# Patient Record
Sex: FEMALE | Race: BLACK OR AFRICAN AMERICAN | Employment: PART TIME | URBAN - METROPOLITAN AREA
[De-identification: names, ages, dates, MRNs, and addresses within clinical notes are randomized per-mention and may not be internally consistent; named-entity substitution may affect disease eponyms.]

---

## 2017-12-22 ENCOUNTER — HOSPITAL ENCOUNTER (EMERGENCY)
Age: 27
Discharge: HOME | End: 2017-12-22
Attending: EMERGENCY MEDICINE | Admitting: EMERGENCY MEDICINE
Payer: COMMERCIAL

## 2017-12-22 VITALS
TEMPERATURE: 98.2 F | SYSTOLIC BLOOD PRESSURE: 128 MMHG | HEIGHT: 64 IN | RESPIRATION RATE: 20 BRPM | DIASTOLIC BLOOD PRESSURE: 78 MMHG | OXYGEN SATURATION: 97 % | HEART RATE: 82 BPM | WEIGHT: 239.42 LBS | BODY MASS INDEX: 40.87 KG/M2

## 2017-12-22 DIAGNOSIS — J06.9 ACUTE UPPER RESPIRATORY INFECTION: Primary | ICD-10-CM

## 2017-12-22 LAB
FLUAV AG NPH QL IA: NEGATIVE
FLUBV AG NOSE QL IA: NEGATIVE

## 2017-12-22 PROCEDURE — 94640 AIRWAY INHALATION TREATMENT: CPT

## 2017-12-22 PROCEDURE — 99283 EMERGENCY DEPT VISIT LOW MDM: CPT

## 2017-12-22 PROCEDURE — 74011000250 HC RX REV CODE- 250: Performed by: EMERGENCY MEDICINE

## 2017-12-22 PROCEDURE — 87804 INFLUENZA ASSAY W/OPTIC: CPT | Performed by: EMERGENCY MEDICINE

## 2017-12-22 PROCEDURE — 77030029684 HC NEB SM VOL KT MONA -A

## 2017-12-22 RX ORDER — IPRATROPIUM BROMIDE AND ALBUTEROL SULFATE 2.5; .5 MG/3ML; MG/3ML
3 SOLUTION RESPIRATORY (INHALATION) ONCE
Status: COMPLETED | OUTPATIENT
Start: 2017-12-22 | End: 2017-12-22

## 2017-12-22 RX ORDER — ALBUTEROL SULFATE 90 UG/1
2 AEROSOL, METERED RESPIRATORY (INHALATION)
Qty: 1 INHALER | Refills: 0 | Status: SHIPPED | OUTPATIENT
Start: 2017-12-22

## 2017-12-22 RX ORDER — IPRATROPIUM BROMIDE AND ALBUTEROL SULFATE 2.5; .5 MG/3ML; MG/3ML
SOLUTION RESPIRATORY (INHALATION)
Status: DISCONTINUED
Start: 2017-12-22 | End: 2017-12-22 | Stop reason: HOSPADM

## 2017-12-22 RX ORDER — BENZONATATE 100 MG/1
100 CAPSULE ORAL
Qty: 30 CAP | Refills: 0 | Status: SHIPPED | OUTPATIENT
Start: 2017-12-22 | End: 2017-12-29

## 2017-12-22 RX ADMIN — IPRATROPIUM BROMIDE AND ALBUTEROL SULFATE 3 ML: .5; 3 SOLUTION RESPIRATORY (INHALATION) at 01:24

## 2017-12-22 NOTE — ED PROVIDER NOTES
EMERGENCY DEPARTMENT HISTORY AND PHYSICAL EXAM      Date: 12/22/2017  Patient Name: Linden More    History of Presenting Illness     Chief Complaint   Patient presents with    Cold Symptoms     cough, nasal congestion, sore throat and sinus pressure onset monday, denies fever       History Provided By: Patient    HPI: Linden More, 28 y.o. female presents ambulatory to the ED with cc of cough with associated post-tussive emesis, nasal congestion, sore throat and generalized body aches x 4 days. Pt reports taking Nyquil, Emergen-C, OTC nasal spray, and cough drops with temporary relief. She denies any known sick contact. Pt specifically denies any fever, chills, diarrhea, HA or ear pain. She is eating and drinking normally. PCP: None    There are no other complaints, changes, or physical findings at this time. Current Facility-Administered Medications   Medication Dose Route Frequency Provider Last Rate Last Dose    albuterol-ipratropium (DUO-NEB) 2.5 mg-0.5 mg/3 ml nebulizer solution              Current Outpatient Prescriptions   Medication Sig Dispense Refill    albuterol (PROVENTIL HFA, VENTOLIN HFA, PROAIR HFA) 90 mcg/actuation inhaler Take 2 Puffs by inhalation every four (4) hours as needed for Wheezing. 1 Inhaler 0    sod bicarb-sod chlor-neti pot pkdv 1 Package by Nasal route two (2) times a day for 7 days. 14 Package 0    benzonatate (TESSALON PERLES) 100 mg capsule Take 1 Cap by mouth three (3) times daily as needed for Cough for up to 7 days. 30 Cap 0       Past History     Past Medical History:  History reviewed. No pertinent past medical history. Past Surgical History:  History reviewed. No pertinent surgical history. Family History:  History reviewed. No pertinent family history.     Social History:  Social History   Substance Use Topics    Smoking status: Never Smoker    Smokeless tobacco: None    Alcohol use No       Allergies:  No Known Allergies      Review of Systems Review of Systems   Constitutional: Negative for activity change, appetite change, chills, fever and unexpected weight change. HENT: Positive for congestion. Negative for ear pain. Eyes: Negative for pain and visual disturbance. Respiratory: Positive for cough (with post-tussive emsis) and shortness of breath. Cardiovascular: Negative for chest pain. Gastrointestinal: Negative for abdominal pain, diarrhea, nausea and vomiting. Genitourinary: Negative for dysuria. Musculoskeletal: Negative for back pain. (+) generalized body aches   Skin: Negative for rash. Neurological: Negative for headaches. Physical Exam   Physical Exam   Constitutional: She is oriented to person, place, and time. Overweight female, minimal acute distress   HENT:   Head: Normocephalic and atraumatic. Mouth/Throat: Posterior oropharyngeal erythema present. No oropharyngeal exudate. No sinus percussion tenderness   Eyes: Conjunctivae and EOM are normal. Pupils are equal, round, and reactive to light. Right eye exhibits no discharge. Left eye exhibits no discharge. Neck: Normal range of motion. Neck supple. Cardiovascular: Normal rate, regular rhythm and normal heart sounds. No murmur heard. Pulmonary/Chest: Effort normal. No respiratory distress. She has no wheezes. She has no rales. Mild bronchospasm   Abdominal: Soft. Bowel sounds are normal. She exhibits no distension. There is no tenderness. Musculoskeletal: Normal range of motion. She exhibits no edema. Lymphadenopathy:     She has no cervical adenopathy. Neurological: She is alert and oriented to person, place, and time. No cranial nerve deficit. She exhibits normal muscle tone. Skin: Skin is warm and dry. No rash noted. She is not diaphoretic. Nursing note and vitals reviewed.         Diagnostic Study Results     Labs -     Recent Results (from the past 12 hour(s))   INFLUENZA A & B AG (RAPID TEST)    Collection Time: 12/22/17  1:15 AM   Result Value Ref Range    Influenza A Antigen NEGATIVE  NEG      Influenza B Antigen NEGATIVE  NEG         Medical Decision Making   I am the first provider for this patient. I reviewed the vital signs, available nursing notes, past medical history, past surgical history, family history and social history. Vital Signs-Reviewed the patient's vital signs. Patient Vitals for the past 12 hrs:   Temp Pulse Resp BP SpO2   12/22/17 0142 - - - 108/71 98 %   12/22/17 0054 98.2 °F (36.8 °C) 82 20 145/81 100 %       Records Reviewed: Nursing Notes and Old Medical Records    Provider Notes (Medical Decision Making): Well appearing female in no acute distress without evidence of PNA, sinusitis, or sepsis. ED Course:   1:08 AM  Initial assessment performed. The patients presenting problems have been discussed, and they are in agreement with the care plan formulated and outlined with them. I have encouraged them to ask questions as they arise throughout their visit. 01:45 Patient appears improved. Discussed results as well as home care. Prescriptions given. Disposition:  DISCHARGE NOTE:  1:58 AM  The patient has been re-evaluated and is ready for discharge. Reviewed available results with patient. Counseled patient on diagnosis and care plan. Patient has expressed understanding, and all questions have been answered. Patient agrees with plan and agrees to follow up as recommended, or return to the ED if their symptoms worsen. Discharge instructions have been provided and explained to the patient, along with reasons to return to the ED. PLAN:  1. Current Discharge Medication List      START taking these medications    Details   albuterol (PROVENTIL HFA, VENTOLIN HFA, PROAIR HFA) 90 mcg/actuation inhaler Take 2 Puffs by inhalation every four (4) hours as needed for Wheezing. Qty: 1 Inhaler, Refills: 0      sod bicarb-sod chlor-neti pot pkdv 1 Package by Nasal route two (2) times a day for 7 days.   Qty: 14 Package, Refills: 0      benzonatate (TESSALON PERLES) 100 mg capsule Take 1 Cap by mouth three (3) times daily as needed for Cough for up to 7 days. Qty: 30 Cap, Refills: 0           2. Follow-up Information     Follow up With Details Comments Contact Info    [facility]  If symptoms worsen [address] [address]  [phone]        Return to ED if worse     Diagnosis     Clinical Impression:   1. Acute upper respiratory infection        Attestations:  ATTESTATION:  This note is prepared by Kwaku Wesley, acting as Scribe for Rosie Ramos MD.     Rosie Ramos MD: The scribe's documentation has been prepared under my direction and personally reviewed by me in its entirety. I confirm that the note above accurately reflects all work, treatment, procedures, and medical decision making performed by me.

## 2017-12-22 NOTE — DISCHARGE INSTRUCTIONS
Saline Nasal Washes: Care Instructions  Your Care Instructions  Saline nasal washes help keep the nasal passages open by washing out thick or dried mucus. This simple remedy can help relieve symptoms of allergies, sinusitis, and colds. It also can make the nose feel more comfortable by keeping the mucous membranes moist. You may notice a little burning sensation in your nose the first few times you use the solution, but this usually gets better in a few days. Follow-up care is a key part of your treatment and safety. Be sure to make and go to all appointments, and call your doctor if you are having problems. It's also a good idea to know your test results and keep a list of the medicines you take. How can you care for yourself at home? · You can buy premixed saline solution in a squeeze bottle or other sinus rinse products at a drugstore. Read and follow the instructions on the label. · You also can make your own saline solution by adding 1 teaspoon of salt and 1 teaspoon of baking soda to 2 cups of distilled water. · If you use a homemade solution, pour a small amount into a clean bowl. Using a rubber bulb syringe, squeeze the syringe and place the tip in the salt water. Pull a small amount of the salt water into the syringe by relaxing your hand. · Sit down with your head tilted slightly back. Do not lie down. Put the tip of the bulb syringe or the squeeze bottle a little way into one of your nostrils. Gently drip or squirt a few drops into the nostril. Repeat with the other nostril. Some sneezing and gagging are normal at first.  · Gently blow your nose. · Wipe the syringe or bottle tip clean after each use. · Repeat this 2 or 3 times a day. · Use nasal washes gently if you have nosebleeds often. When should you call for help? Watch closely for changes in your health, and be sure to contact your doctor if:  ? · You often get nosebleeds. ? · You have problems doing the nasal washes.    Where can you learn more? Go to http://jerry-stella.info/. Enter 071 [id number] [id number] in the search box to learn more about \"Saline Nasal Washes: Care Instructions. \"  Current as of: May 12, 2017  Content Version: 11.4  © 3587-6825 [employer]. Care instructions adapted under license by [employer] (which disclaims liability or warranty for this information). If you have questions about a medical condition or this instruction, always ask your healthcare professional. [address] any warranty or liability for your use of this information. Upper Respiratory Infection (Cold): Care Instructions  Your Care Instructions    An upper respiratory infection, or URI, is an infection of the nose, sinuses, or throat. URIs are spread by coughs, sneezes, and direct contact. The common cold is the most frequent kind of URI. The flu and sinus infections are other kinds of URIs. Almost all URIs are caused by viruses. Antibiotics won't cure them. But you can treat most infections with home care. This may include drinking lots of fluids and taking over-the-counter pain medicine. You will probably feel better in 4 to 10 days. The doctor has checked you carefully, but problems can develop later. If you notice any problems or new symptoms, get medical treatment right away. Follow-up care is a key part of your treatment and safety. Be sure to make and go to all appointments, and call your doctor if you are having problems. It's also a good idea to know your test results and keep a list of the medicines you take. How can you care for yourself at home? · To prevent dehydration, drink plenty of fluids, enough so that your urine is light yellow or clear like water. Choose water and other caffeine-free clear liquids until you feel better. If you have kidney, heart, or liver disease and have to limit fluids, talk with your doctor before you increase the amount of fluids you drink.   · Take an over-the-counter pain medicine, such as acetaminophen (Tylenol), ibuprofen (Advil, Motrin), or naproxen (Aleve). Read and follow all instructions on the label. · Before you use cough and cold medicines, check the label. These medicines may not be safe for young children or for people with certain health problems. · Be careful when taking over-the-counter cold or flu medicines and Tylenol at the same time. Many of these medicines have acetaminophen, which is Tylenol. Read the labels to make sure that you are not taking more than the recommended dose. Too much acetaminophen (Tylenol) can be harmful. · Get plenty of rest.  · Do not smoke or allow others to smoke around you. If you need help quitting, talk to your doctor about stop-smoking programs and medicines. These can increase your chances of quitting for good. When should you call for help? Call 911 anytime you think you may need emergency care. For example, call if:  ? · You have severe trouble breathing. ?Call your doctor now or seek immediate medical care if:  ? · You seem to be getting much sicker. ? · You have new or worse trouble breathing. ? · You have a new or higher fever. ? · You have a new rash. ? Watch closely for changes in your health, and be sure to contact your doctor if:  ? · You have a new symptom, such as a sore throat, an earache, or sinus pain. ? · You cough more deeply or more often, especially if you notice more mucus or a change in the color of your mucus. ? · You do not get better as expected. Where can you learn more? Go to http://jerry-stella.info/. Enter I851 in the search box to learn more about \"Upper Respiratory Infection (Cold): Care Instructions. \"  Current as of: May 12, 2017  Content Version: 11.4  © 6863-6460 [employer]. Care instructions adapted under license by [employer] (which disclaims liability or warranty for this information).  If you have questions about a medical condition or this instruction, always ask your healthcare professional. [place] [address] any warranty or liability for your use of this information.

## 2017-12-22 NOTE — ED NOTES
Patient discharged and given discharge instructions by Felipe Miller MD. Patient had an opportunity to ask questions. Patient verbalized understanding of discharge instructions. Patient d/c from ED ambulatory, discharge instructions and prescriptions in hand. Patient accompanied by self.

## 2017-12-22 NOTE — ED NOTES
Patient presents to ED with C/O nasal drainage, body aches, fatigue, productive cough, and stress incontinence when coughing and sneezing that started Monday. The pt stated the symptoms started on Monday with a sore throat. The pt denies fever and chills. Patient is A&Ox3, call bell w/in reach, and aware of plan of care. The patient is in NAD.

## 2020-11-14 ENCOUNTER — HOSPITAL ENCOUNTER (EMERGENCY)
Age: 30
Discharge: HOME | End: 2020-11-14
Attending: EMERGENCY MEDICINE

## 2020-11-14 ENCOUNTER — APPOINTMENT (OUTPATIENT)
Dept: GENERAL RADIOLOGY | Age: 30
End: 2020-11-14
Attending: PHYSICIAN ASSISTANT

## 2020-11-14 VITALS
BODY MASS INDEX: 38.71 KG/M2 | DIASTOLIC BLOOD PRESSURE: 61 MMHG | WEIGHT: 232.37 LBS | SYSTOLIC BLOOD PRESSURE: 121 MMHG | HEART RATE: 72 BPM | OXYGEN SATURATION: 100 % | HEIGHT: 65 IN | RESPIRATION RATE: 14 BRPM | TEMPERATURE: 98.9 F

## 2020-11-14 DIAGNOSIS — S91.209A AVULSION OF TOENAIL, INITIAL ENCOUNTER: Primary | ICD-10-CM

## 2020-11-14 PROCEDURE — 74011000250 HC RX REV CODE- 250: Performed by: PHYSICIAN ASSISTANT

## 2020-11-14 PROCEDURE — 75810000283 HC INJECTION NERVE BLOCK

## 2020-11-14 PROCEDURE — 73660 X-RAY EXAM OF TOE(S): CPT

## 2020-11-14 PROCEDURE — 99282 EMERGENCY DEPT VISIT SF MDM: CPT

## 2020-11-14 RX ORDER — LIDOCAINE HYDROCHLORIDE 10 MG/ML
5 INJECTION, SOLUTION EPIDURAL; INFILTRATION; INTRACAUDAL; PERINEURAL ONCE
Status: COMPLETED | OUTPATIENT
Start: 2020-11-14 | End: 2020-11-14

## 2020-11-14 RX ORDER — CEPHALEXIN 500 MG/1
500 CAPSULE ORAL 4 TIMES DAILY
Qty: 28 CAP | Refills: 0 | Status: SHIPPED | OUTPATIENT
Start: 2020-11-14 | End: 2020-11-21

## 2020-11-14 RX ADMIN — LIDOCAINE HYDROCHLORIDE 5 ML: 10 INJECTION, SOLUTION EPIDURAL; INFILTRATION; INTRACAUDAL; PERINEURAL at 19:01

## 2020-11-14 NOTE — ED NOTES
Patient ambulatory to room w c/o striking her toe on her son's toy around 1400. Patient c/o R pinky toe pain. 1836- Patient also reports her coworker tested positive so she wants to make sure she is safe. 65- Discharge instructions given to patient by [facility].  Verbalized understanding of instructions. Patient discharged without difficulty. Patient discharged in stable condition ambulatory.

## 2020-11-14 NOTE — ED PROVIDER NOTES
EMERGENCY DEPARTMENT HISTORY AND PHYSICAL EXAM      Date: 11/14/2020  Patient Name: Alfred Jones    History of Presenting Illness     Chief Complaint   Patient presents with    Toe Pain     stubbed her fifth toe right foot on her son's toy; the toenail is pulled up it is almost off.  Concern For COVID-19 (Coronavirus)     aysmptomatic but would like the test; she had a coworker test positive. History Provided By: Patient    HPI: Alfred Jones, 31 y.o. female without significant PMHx, presents by POV to the ED with cc of right 5th toe pain. The patient notes that this afternoon she accidentally stubbed her toe on her son's toy. She had immediate onset of pain and bleeding. She applied a Band-Aid and then several hours later took the [address] off to reapply a new one and noticed that the toenail had almost completely come off. The pain is localized and nonradiating. The patient reports that she was exposed very briefly to a coworker who tested positive for Covid. The patient stated exposure was Wednesday, November 11. The patient is asymptomatic. She has no URI complaints. There is been no fever, chills, cough, rhinorrhea, congestion, loss of sensation of smell or taste. She is already scheduled herself to a rapid test this coming Tuesday, November 17 at [place]. There are no other complaints, changes, or physical findings at this time. Social Hx: Tobacco (denies), EtOH (social; rare), Illicit drug use (denies)     PCP: [facility], Pcp    No current facility-administered medications on file prior to encounter. Current Outpatient Medications on File Prior to Encounter   Medication Sig Dispense Refill    albuterol (PROVENTIL HFA, VENTOLIN HFA, PROAIR HFA) 90 mcg/actuation inhaler Take 2 Puffs by inhalation every four (4) hours as needed for Wheezing. 1 Inhaler 0       Past History     Past Medical History:  History reviewed. No pertinent past medical history.     Past Surgical History:  History reviewed. No pertinent surgical history. Family History:  History reviewed. No pertinent family history. Social History:  Social History     Tobacco Use    Smoking status: Never Smoker    Smokeless tobacco: Never Used   Substance Use Topics    Alcohol use: No    Drug use: No       Allergies:  No Known Allergies      Review of Systems   Review of Systems   Constitutional: Negative for chills, diaphoresis and fever. HENT: Negative for congestion, ear pain, rhinorrhea and sore throat. Respiratory: Negative for cough and shortness of breath. Cardiovascular: Negative for chest pain. Gastrointestinal: Negative for abdominal pain, constipation, diarrhea, nausea and vomiting. Genitourinary: Negative for difficulty urinating, dysuria, frequency and hematuria. Musculoskeletal: Positive for arthralgias. Negative for myalgias. Skin: Positive for wound. Neurological: Negative for headaches. All other systems reviewed and are negative. Physical Exam   Physical Exam  Vitals signs and nursing note reviewed. Constitutional:       General: She is not in acute distress. Appearance: She is well-developed. She is not diaphoretic. Comments: 31 y.o. -American female    HENT:      Head: Normocephalic and atraumatic. Eyes:      General:         Right eye: No discharge. Left eye: No discharge. Conjunctiva/sclera: Conjunctivae normal.   Neck:      Musculoskeletal: Normal range of motion and neck supple. Cardiovascular:      Rate and Rhythm: Normal rate and regular rhythm. Heart sounds: Normal heart sounds. No murmur. Pulmonary:      Effort: Pulmonary effort is normal. No respiratory distress. Breath sounds: Normal breath sounds. Musculoskeletal:      Comments: Right fifth toe with the nail nearly completely avulsed. There is slight swelling. There is no ecchymosis or deformity.   There is minimal tenderness palpation to the distal aspect of the toe. Skin:     General: Skin is warm and dry. Neurological:      Mental Status: She is alert and oriented to person, place, and time. Psychiatric:         Behavior: Behavior normal.         Diagnostic Study Results     Labs - none    Radiologic Studies -   XR 5TH TOE RT MIN 2 V   Final Result   IMPRESSION:       Fifth toe soft tissue laceration. No radiodense foreign body or fracture. Medical Decision Making   I am the first provider for this patient. I reviewed the vital signs, available nursing notes, past medical history, past surgical history, family history and social history. Vital Signs-Reviewed the patient's vital signs. Patient Vitals for the past 12 hrs:   Temp Pulse Resp BP SpO2   11/14/20 1817 98.9 °F (37.2 °C) 72 14 121/61 100 %       Records Reviewed: Nursing Notes    Provider Notes (Medical Decision Making):   Patient presents the ED with a toe injury. Differential diagnosis include fracture, sprain, strain, contusion, nail avulsion. X-rays are negative for fracture. The nail is nearly completely avulsed and removed as procedure note below. ED Course:   Initial assessment performed. The patients presenting problems have been discussed, and they are in agreement with the care plan formulated and outlined with them. I have encouraged them to ask questions as they arise throughout their visit. Patient had discussed Covid testing in the ED. At this time is only been 3 days since her exposure, which was less than 15 minutes and both parties were masked. The current guidelines note that positive exposure is being within 6 feet of somebody for 15 minutes or more irregardless with a mask is worn or not. Additionally, it is recommended that asymptomatic patients wait 5 to 7 days from exposure prior to testing.   Since the patient's exposure was very limited and it is not been the recommended length of time for testing in asymptomatic patient she is decided to wait until her scheduled appointment at [facility] this coming Tuesday for testing. Procedure Note - Nail Avulsion:   7:39 PM  Performed by: AMAURI Lucas   Pt's  right 5th toe was anesthetized locally with 2 mL of Lidocaine 1% without epi in a digital block. The nail was removed using scissors. No discharge expressed. Estimated blood loss: None  The procedure took 1-15 minutes, and pt tolerated well. Critical Care Time: None    Disposition:  Presents the ED for toe injury. Differential diagnosis include fracture, sprain, strain, avulsion, nail injury. X-rays are negative for acute injury. The nail has been removed as procedure note below. PLAN:  1. Current Discharge Medication List      START taking these medications    Details   cephALEXin (Keflex) 500 mg capsule Take 1 Cap by mouth four (4) times daily for 7 days. Qty: 28 Cap, Refills: 0           2. Follow-up Information     Follow up With Specialties Details Why Contact Info    Amy Felipe MD Orthopedic Surgery In 1 week As needed [address] [place] [address]  [address]  [address] [facility] [place]  [phone] as scheduled on Tuesday for [address]          3. Wound care as discussed. Return to ED if worse     Diagnosis     Clinical Impression:   1. Avulsion of toenail, initial encounter          Please note that this dictation was completed with Kaai, the computer voice recognition software. Quite often unanticipated grammatical, syntax, homophones, and other interpretive errors are inadvertently transcribed by the computer software. Please disregards these errors. Please excuse any errors that have escaped final proofreading.

## 2021-08-07 ENCOUNTER — HOSPITAL ENCOUNTER (EMERGENCY)
Age: 31
Discharge: HOME | End: 2021-08-07
Attending: EMERGENCY MEDICINE

## 2021-08-07 VITALS
RESPIRATION RATE: 16 BRPM | WEIGHT: 244.49 LBS | TEMPERATURE: 98.4 F | OXYGEN SATURATION: 100 % | HEART RATE: 62 BPM | BODY MASS INDEX: 41.74 KG/M2 | SYSTOLIC BLOOD PRESSURE: 115 MMHG | HEIGHT: 64 IN | DIASTOLIC BLOOD PRESSURE: 62 MMHG
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
## 2021-08-07 DIAGNOSIS — M75.41 IMPINGEMENT SYNDROME OF RIGHT SHOULDER: Primary | ICD-10-CM

## 2021-08-07 LAB — HCG UR QL: NEGATIVE

## 2021-08-07 PROCEDURE — 74011250637 HC RX REV CODE- 250/637: Performed by: EMERGENCY MEDICINE

## 2021-08-07 PROCEDURE — 99283 EMERGENCY DEPT VISIT LOW MDM: CPT

## 2021-08-07 PROCEDURE — 81025 URINE PREGNANCY TEST: CPT

## 2021-08-07 RX ORDER — IBUPROFEN 400 MG/1
800 TABLET ORAL ONCE
Status: COMPLETED | OUTPATIENT
Start: 2021-08-07 | End: 2021-08-07

## 2021-08-07 RX ORDER — ACETAMINOPHEN 325 MG/1
650 TABLET ORAL ONCE
Status: COMPLETED | OUTPATIENT
Start: 2021-08-07 | End: 2021-08-07

## 2021-08-07 RX ADMIN — IBUPROFEN 800 MG: 400 TABLET, FILM COATED ORAL at 13:34

## 2021-08-07 RX ADMIN — ACETAMINOPHEN 650 MG: 325 TABLET ORAL at 13:34
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
## 2021-08-07 NOTE — LETTER
[address]  [facility] EMERGENCY DEPT  [address] [facility]  [place] [id number] [phone]    Work/School Note    Date: 8/7/2021    To Whom It May concern:    Fannie Waller was seen and treated today in the emergency room by the following Scooter Matos MD.      Fannie Waller may return to work on August 9, 2021.     Sincerely,          Jeremy Baum

## 2021-08-07 NOTE — ED NOTES
Adryan Mason reviewed discharge instructions with the patient. The patient verbalized understanding. All questions and concerns were addressed. The patient declined a wheelchair and is discharged ambulatory in the care of family members with instructions and prescriptions in hand. Pt is alert and oriented x 4. Respirations are clear and unlabored.
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
## 2021-08-07 NOTE — Clinical Note
[address]  [facility] EMERGENCY DEPT  [address] [place] [address]  Anaid Velasquez [id number] [phone]    Work/School Note    Date: 8/7/2021    To Whom It May concern:    Deneen Chavarria was seen and treated today in the emergency room by the following provider(s):  Attending Provider: Sabrina Wright MD.      Deneen Chavarria is excused from work/school on 08/07/21 and 08/08/21. She is medically clear to return to work/school on 8/9/2021.        Sincerely,          Adela Mendoza MD

## 2021-12-31 ENCOUNTER — APPOINTMENT (OUTPATIENT)
Dept: GENERAL RADIOLOGY | Age: 31
End: 2021-12-31
Attending: EMERGENCY MEDICINE

## 2021-12-31 ENCOUNTER — HOSPITAL ENCOUNTER (EMERGENCY)
Age: 31
Discharge: HOME | End: 2021-12-31
Attending: EMERGENCY MEDICINE

## 2021-12-31 VITALS
HEART RATE: 68 BPM | RESPIRATION RATE: 18 BRPM | BODY MASS INDEX: 42.15 KG/M2 | HEIGHT: 64 IN | OXYGEN SATURATION: 100 % | WEIGHT: 246.91 LBS | SYSTOLIC BLOOD PRESSURE: 127 MMHG | TEMPERATURE: 98.2 F | DIASTOLIC BLOOD PRESSURE: 67 MMHG

## 2021-12-31 DIAGNOSIS — J02.9 ACUTE PHARYNGITIS, UNSPECIFIED ETIOLOGY: ICD-10-CM

## 2021-12-31 DIAGNOSIS — R09.81 NASAL CONGESTION: ICD-10-CM

## 2021-12-31 DIAGNOSIS — J06.9 ACUTE UPPER RESPIRATORY INFECTION: ICD-10-CM

## 2021-12-31 DIAGNOSIS — U07.1 COVID-19 VIRUS INFECTION: Primary | ICD-10-CM

## 2021-12-31 LAB — DEPRECATED S PYO AG THROAT QL EIA: NEGATIVE

## 2021-12-31 PROCEDURE — 71045 X-RAY EXAM CHEST 1 VIEW: CPT

## 2021-12-31 PROCEDURE — 87070 CULTURE OTHR SPECIMN AEROBIC: CPT

## 2021-12-31 PROCEDURE — 87880 STREP A ASSAY W/OPTIC: CPT

## 2021-12-31 PROCEDURE — 74011250637 HC RX REV CODE- 250/637: Performed by: EMERGENCY MEDICINE

## 2021-12-31 PROCEDURE — 99282 EMERGENCY DEPT VISIT SF MDM: CPT

## 2021-12-31 RX ORDER — ASCORBIC ACID 500 MG
500 TABLET ORAL 2 TIMES DAILY
Qty: 10 TABLET | Refills: 0 | Status: SHIPPED | OUTPATIENT
Start: 2021-12-31 | End: 2022-01-05

## 2021-12-31 RX ORDER — MONTELUKAST SODIUM 10 MG/1
10 TABLET ORAL DAILY
Qty: 20 TABLET | Refills: 0 | Status: SHIPPED | OUTPATIENT
Start: 2021-12-31

## 2021-12-31 RX ORDER — OXYMETAZOLINE HCL 0.05 %
2 SPRAY, NON-AEROSOL (ML) NASAL ONCE
Status: COMPLETED | OUTPATIENT
Start: 2021-12-31 | End: 2021-12-31

## 2021-12-31 RX ORDER — DEXAMETHASONE SODIUM PHOSPHATE 4 MG/ML
10 INJECTION, SOLUTION INTRA-ARTICULAR; INTRALESIONAL; INTRAMUSCULAR; INTRAVENOUS; SOFT TISSUE ONCE
Status: COMPLETED | OUTPATIENT
Start: 2021-12-31 | End: 2021-12-31

## 2021-12-31 RX ORDER — UREA 10 %
220 LOTION (ML) TOPICAL 2 TIMES DAILY
Qty: 45 TABLET | Refills: 0 | Status: SHIPPED | OUTPATIENT
Start: 2021-12-31 | End: 2022-01-05

## 2021-12-31 RX ORDER — PREDNISONE 10 MG/1
TABLET ORAL
Qty: 21 TABLET | Refills: 0 | Status: SHIPPED | OUTPATIENT
Start: 2021-12-31 | End: 2022-10-31 | Stop reason: ALTCHOICE

## 2021-12-31 RX ORDER — GUAIFENESIN 600 MG/1
600 TABLET, EXTENDED RELEASE ORAL 2 TIMES DAILY
Qty: 20 TABLET | Refills: 0 | Status: SHIPPED | OUTPATIENT
Start: 2021-12-31

## 2021-12-31 RX ADMIN — DEXAMETHASONE SODIUM PHOSPHATE 10 MG: 4 INJECTION INTRA-ARTICULAR; INTRALESIONAL; INTRAMUSCULAR; INTRAVENOUS; SOFT TISSUE at 04:42

## 2021-12-31 RX ADMIN — OXYMETAZOLINE HCL 2 SPRAY: 0.05 SPRAY NASAL at 04:42

## 2021-12-31 NOTE — DISCHARGE INSTRUCTIONS
Thank You! It was a pleasure taking care of you in our Emergency Department today. We know that when you come to [facility], you are entrusting us with your health, comfort, and safety. Our physicians and nurses honor that trust, and truly appreciate the opportunity to care for you and your loved ones. We also value your feedback. If you receive a survey about your Emergency Department experience today, please fill it out. We care about our patients' feedback, and we listen to what you have to say. Thank you. Dr. Derek Lomeli M.D.      ________________________________________________________________________  I have included a copy of your lab results and/or radiologic studies from today's visit so you can have them easily available at your follow-up visit. We hope you feel better and please do not hesitate to contact the ED if you have any questions at all! No results found for this or any previous visit (from the past 12 hour(s)). XR CHEST PORT   Final Result   No acute process. CT Results  (Last 48 hours)      None          The exam and treatment you received in the Emergency Department were for an urgent problem and are not intended as complete care. It is important that you follow up with a doctor, nurse practitioner, or physician assistant for ongoing care. If your symptoms become worse or you do not improve as expected and you are unable to reach your usual health care provider, you should return to the Emergency Department. We are available 24 hours a day. Please take your discharge instructions with you when you go to your follow-up appointment. If a prescription has been provided, please have it filled as soon as possible to prevent a delay in treatment. Read the entire medication instruction sheet provided to you by the pharmacy.  If you have any questions or reservations about taking the medication due to side effects or interactions with other medications, please call your primary care physician or contact the ER to speak with the charge nurse. Please make an appointment with your family doctor or the physician you were referred to for follow-up of this visit as instructed on your discharge paperwork. Return to the ER if you are unable to be seen or if you are unable to be seen in a timely manner. If you have any problem arranging the follow-up visit, contact the Emergency Department immediately.

## 2021-12-31 NOTE — ED PROVIDER NOTES
EMERGENCY DEPARTMENT HISTORY AND PHYSICAL EXAM      Please note that this dictation was completed with the assistance of \"Dragon\", the computer voice recognition software. Quite often unanticipated grammatical, syntax, homophones, and other interpretive errors are inadvertently transcribed by the computer software. Please disregard these errors and any errors that have escaped final proofreading. Thank you. Patient: Susan Costa  DOS: 21  : 1990  MRN: [id number]  History of Presenting Illness     Chief Complaint   Patient presents with    Positive For Covid-19     Pt ambulatroy to triage, tested positive ; having dry cough, sore throat; is vaccinated x 2      History Provided By: Patient/family/EMS (if applicable)    HPI: Susan Costa, 27 y.o. female with past medical history as documented below presents to the ED with c/o of 5 days of dry cough, sore throat, nasal congestion. Patient states that she was tested positive for COVID-19 on [place]. She is vaccinated x2. Denies any chest pain or shortness of breath. Pt denies any other exacerbating or ameliorating factors. Additionally, pt specifically denies any recent fever, chills, headache, nausea, vomiting, abdominal pain, CP, SOB, lightheadedness, dizziness, numbness, weakness, lower extremity swelling, heart palpitations, urinary sxs, diarrhea, constipation, melena, hematochezia. There are no other complaints, changes or physical findings pertinent to the HPI at this time.     PCP: [facility], Pcp  Past History   Past Medical History:  Denies    Past Surgical History:  Denies    Family History:   Family history reviewed and was non-contributory, unless specified below:    Social History:  Social History     Tobacco Use    Smoking status: Never Smoker    Smokeless tobacco: Never Used   Substance Use Topics    Alcohol use: No    Drug use: No       Allergies:  No Known Allergies    Current Medications:  No current facility-administered medications on file prior to encounter. Current Outpatient Medications on File Prior to Encounter   Medication Sig Dispense Refill    albuterol (PROVENTIL HFA, VENTOLIN HFA, PROAIR HFA) 90 mcg/actuation inhaler Take 2 Puffs by inhalation every four (4) hours as needed for Wheezing. 1 Inhaler 0     Review of Systems   A complete ROS was reviewed by me today and all other systems negative, unless otherwise specified below:  Review of Systems   Constitutional: Negative. Negative for chills and fever. HENT: Positive for congestion and sore throat. Eyes: Negative. Respiratory: Positive for cough. Negative for chest tightness, shortness of breath and wheezing. Cardiovascular: Negative. Negative for chest pain, palpitations and leg swelling. Gastrointestinal: Negative. Negative for abdominal distention, abdominal pain, blood in stool, constipation, diarrhea, nausea and vomiting. Endocrine: Negative. Genitourinary: Negative. Negative for dysuria, flank pain, frequency, hematuria and urgency. Musculoskeletal: Negative. Negative for arthralgias, back pain and myalgias. Skin: Negative. Negative for color change and rash. Neurological: Negative. Negative for dizziness, syncope, speech difficulty, weakness, light-headedness, numbness and headaches. Hematological: Negative. Psychiatric/Behavioral: Negative. Negative for confusion and self-injury. The patient is not nervous/anxious. All other systems reviewed and are negative. Physical Exam   Physical Exam  Vitals and nursing note reviewed. Constitutional:       General: She is not in acute distress. Appearance: She is well-developed. She is not diaphoretic. HENT:      Head: Normocephalic and atraumatic. Nose: Congestion present. Mouth/Throat:      Pharynx: No oropharyngeal exudate.    Eyes:      Conjunctiva/sclera: Conjunctivae normal.   Cardiovascular:      Rate and Rhythm: Normal rate and regular rhythm. Heart sounds: Normal heart sounds. Pulmonary:      Effort: Pulmonary effort is normal. No respiratory distress. Breath sounds: Normal breath sounds. No wheezing or rales. Chest:      Chest wall: No tenderness. Abdominal:      General: Bowel sounds are normal. There is no distension. Palpations: Abdomen is soft. There is no mass. Tenderness: There is no abdominal tenderness. There is no guarding or rebound. Musculoskeletal:         General: Normal range of motion. Cervical back: Normal range of motion. Skin:     General: Skin is warm. Neurological:      Mental Status: She is alert and oriented to person, place, and time. Cranial Nerves: No cranial nerve deficit. Motor: No abnormal muscle tone. Diagnostic Study Results     Laboratory Data  I have personally reviewed and interpreted all available laboratory results. Recent Results (from the past 24 hour(s))   STREP AG SCREEN, GROUP A    Collection Time: 12/31/21  4:44 AM    Specimen: Swab; Throat   Result Value Ref Range    Group A Strep Ag ID Negative NEG         Radiologic Studies   I have personally reviewed and interpreted all available imaging studies and agree with radiology interpretation. XR CHEST PORT   Final Result   No acute process. CT Results  (Last 48 hours)    None        CXR Results  (Last 48 hours)               12/31/21 0308  XR CHEST PORT Final result    Impression:  No acute process. Narrative:  INDICATION: cough, covid+       EXAM:  AP CHEST RADIOGRAPH       COMPARISON: June 9, 2016       FINDINGS:       AP portable view of the chest demonstrates a normal cardiomediastinal   silhouette. There is no edema, effusion, consolidation, or pneumothorax. The   osseous structures are unremarkable. Medical Decision Making   I am the first and primary ED physician for this patient's ED visit today.     I reviewed our electronic medical record system for any past medical records that may contribute to the patient's current condition, including their past medical history, surgical history, social and family history. This also includes their most recent ED visits, previous hospitalizations and prior diagnostic data. I have reviewed and summarized the most pertinent findings in my HPI and MDM. Vital Signs Reviewed:  Patient Vitals for the past 24 hrs:   Temp Pulse Resp BP SpO2   12/31/21 0219 98.2 °F (36.8 °C) 68 18 127/67 100 %       Records Reviewed: Nursing Notes, Old Medical Records, Previous electrocardiograms, Previous Radiology Studies and Previous Laboratory Studies, EMS reports    Provider Notes (Medical Decision Making):   Pt presents with acute URI symptoms including nasal congestion, rhinorrhea and sore throat. Pt also has c/o of cough without dyspnea, chest pain or wheezing. Pt is well-appearing with stable vitals and benign exam; symptoms are consistent with an uncomplicated URI. DDx: COVID-19, acute bronchitis, bacterial sinusitis vs. pharyngitis, migraine, flu. Symptomatic therapy suggested: acetaminophen, ibuprofen, antihistamine-decongestant of choice, cough suppressant of choice. Increase fluids, use vaporizer, stay in steamy bathroom tid 15 min prn severe cough, tylenol as needed, rest, avoid smoky areas. Lack of antibiotic effectiveness discussed with her. Symptomatic therapy suggested: gargle for sore throat, use mist at bedside for congestion. Apply facial warm packs for sinus pain or use nasal saline sprays. Follow up prn if not better in 72 hours. ED Course:   Initial assessment performed. I discussed presenting problems and concerns, and my formulated plan for today's visit with the patient and any available family members. I have encouraged them to ask questions as they arise throughout the visit.    Social History     Tobacco Use    Smoking status: Never Smoker    Smokeless tobacco: Never Used   Substance Use Topics    Alcohol use: No    Drug use: No       ED Orders Placed:  Orders Placed This Encounter    STREP AG SCREEN, GROUP A    CULTURE, THROAT    XR CHEST PORT    dexamethasone (DECADRON) 4 mg/mL injection 10 mg    oxymetazoline (AFRIN) 0.05 % nasal spray 2 Spray    montelukast (Singulair) 10 mg tablet    predniSONE (STERAPRED DS) 10 mg dose pack    zinc sulfate 50 mg zinc (220 mg) tablet    ascorbic acid, vitamin C, (VITAMIN C) 500 mg tablet    guaiFENesin ER (Mucinex) 600 mg ER tablet       ED Medications Administered During ED Course:  Medications   dexamethasone (DECADRON) 4 mg/mL injection 10 mg (10 mg Oral Given 12/31/21 0442)   oxymetazoline (AFRIN) 0.05 % nasal spray 2 Spray (2 Sprays Both Nostrils Given 12/31/21 0442)         Progress Note:  Given concerns for COVID-19 infection in this patient, I spent extra time to ensure proper and full PPE was used for the initial assessment and for subsequent patient encounters for updates and reassessments. This was done to help combat the transmission of the virus to myself and other patients and staff in the emergency department. Progress note:  Pt notes feeling better after ED treatment. Pt ambulated with pulse ox with saturations maintain > 92% and tolerated well. Discussed lab and imaging findings with pt, specifically noting possible COVID-19 infection. Patient instructed on proper hygiene and self-quarantine, as well as lying prone for 3 hours a day. Pt instructed to self-isolate at home until 3 days after symptoms have resolved AND 7 days after symptoms first started, whichever is later. Provided with [facility] handout for likely COVID infection recommendations. Pt will follow up with health department or this emergency department immediately should symptoms worsen at any time as instructed. All questions have been answered, pt voiced understanding and agreement with plan. Progress Note:  I have just re-evaluated the patient.  Patient reports improvement of sx's. I have reviewed Her vital signs and determined there is currently no worsening in their condition or physical exam. Results have been reviewed with them and their questions have been answered. We will continue to review further results as they come available. Progress Note:  Pt reassessed and symptoms noted to have improved significantly after ED treatment. Pt is clinically stable for discharge. Wendy Villegas's labs and imaging have been reviewed with her and available family. She verbally conveys understanding and agreement of the signs, symptoms, diagnosis, treatment and prognosis and additionally agrees to follow up as recommended with Dr. Tessa Harris and/or specialist as instructed. She agrees with the care plan we have created and conveys that all of her questions have been answered. Additionally, I have put together a packet of discharge instructions for her that include: 1) educational information regarding their diagnosis, 2) how to care for their diagnosis at home, as well a 3) list of reasons why they would want to return to the ED prior to their follow-up appointment should the patient's condition change or symptoms worsen. I have answered all questions to the patient's satisfaction. Strict return precautions given. She conveyed understanding and agreement with care plan. Vital signs stable for discharge. Disposition:  DISCHARGE  The pt is ready for discharge. The pt's signs, symptoms, diagnosis, and discharge instructions have been discussed and pt has conveyed their understanding. The pt is to follow up as recommended or return to ER should their symptoms worsen. Plan has been discussed and pt is in agreement. Plan:  1. Return precautions as discussed with patient and available family/caregiver.      2.   Discharge Medication List as of 12/31/2021  4:27 AM      START taking these medications    Details   montelukast (Singulair) 10 mg tablet Take 1 Tablet by mouth daily. , Normal, Disp-20 Tablet, R-0      predniSONE (STERAPRED DS) 10 mg dose pack Take as prescribed, Normal, Disp-21 Tablet, R-0      zinc sulfate 50 mg zinc (220 mg) tablet Take 4.5 Tablets by mouth two (2) times a day for 5 days. , Normal, Disp-45 Tablet, R-0      ascorbic acid, vitamin C, (VITAMIN C) 500 mg tablet Take 1 Tablet by mouth two (2) times a day for 5 days. , Normal, Disp-10 Tablet, R-0      guaiFENesin ER (Mucinex) 600 mg ER tablet Take 1 Tablet by mouth two (2) times a day., Normal, Disp-20 Tablet, R-0         CONTINUE these medications which have NOT CHANGED    Details   albuterol (PROVENTIL HFA, VENTOLIN HFA, PROAIR HFA) 90 mcg/actuation inhaler Take 2 Puffs by inhalation every four (4) hours as needed for Wheezing., Print, Disp-1 Inhaler, R-0           3. Follow-up Information    None       Instructed to return to ED if worse  Diagnosis   Clinical Impression:  1. COVID-19 virus infection    2. Acute pharyngitis, unspecified etiology    3. Acute upper respiratory infection    4. Nasal congestion      Attestation:  Darling Elias MD, am the attending of record for this patient. I personally performed the services described in this documentation on this date, 12/31/2021 for patient, Naif Ewing. I have reviewed the chart and verified that the record is accurate and complete.

## 2021-12-31 NOTE — Clinical Note
[address]  [facility] EMERGENCY DEPT  [address] [facility]  [place] [id number] [phone]    Work/School Note    Date: 12/31/2021     To Whom It May concern:    Issac Vargas was evaluated by the following provider(s):  Attending Provider: Maggie Casillas MD.   Viktor Dickerson virus is suspected. Per the CDC guidelines we recommend home isolation until the following conditions are all met:    1. At least five days have passed since symptoms first appeared and/or had a close exposure,   2. After home isolation for five days, wearing a mask around others for the next five days,  3. At least 24 have passed since last fever without the use of fever-reducing medications and  4.  Symptoms (eg cough, shortness of breath) have improved      Sincerely,          Burt Rodrigues MD

## 2022-01-02 LAB
BACTERIA SPEC CULT: NORMAL
SERVICE CMNT-IMP: NORMAL

## 2022-10-31 ENCOUNTER — HOSPITAL ENCOUNTER (EMERGENCY)
Age: 32
Discharge: HOME | End: 2022-11-01
Attending: EMERGENCY MEDICINE

## 2022-10-31 ENCOUNTER — APPOINTMENT (OUTPATIENT)
Dept: GENERAL RADIOLOGY | Age: 32
End: 2022-10-31
Attending: PHYSICIAN ASSISTANT

## 2022-10-31 VITALS
HEIGHT: 64 IN | RESPIRATION RATE: 18 BRPM | WEIGHT: 245.59 LBS | HEART RATE: 66 BPM | TEMPERATURE: 98.2 F | DIASTOLIC BLOOD PRESSURE: 75 MMHG | SYSTOLIC BLOOD PRESSURE: 118 MMHG | BODY MASS INDEX: 41.93 KG/M2 | OXYGEN SATURATION: 100 %

## 2022-10-31 DIAGNOSIS — M75.51 ACUTE SHOULDER BURSITIS, RIGHT: ICD-10-CM

## 2022-10-31 DIAGNOSIS — M25.511 PAIN IN JOINT OF RIGHT SHOULDER: Primary | ICD-10-CM

## 2022-10-31 PROCEDURE — 74011000250 HC RX REV CODE- 250: Performed by: EMERGENCY MEDICINE

## 2022-10-31 PROCEDURE — 99283 EMERGENCY DEPT VISIT LOW MDM: CPT

## 2022-10-31 PROCEDURE — 73030 X-RAY EXAM OF SHOULDER: CPT

## 2022-10-31 RX ORDER — PREDNISONE 10 MG/1
TABLET ORAL
Qty: 48 TABLET | Refills: 0 | Status: SHIPPED | OUTPATIENT
Start: 2022-10-31

## 2022-10-31 RX ORDER — LIDOCAINE 4 G/100G
1 PATCH TOPICAL EVERY 24 HOURS
Status: DISCONTINUED | OUTPATIENT
Start: 2022-10-31 | End: 2022-11-01 | Stop reason: HOSPADM

## 2022-10-31 RX ORDER — DIAZEPAM 5 MG/1
5 TABLET ORAL
Qty: 15 TABLET | Refills: 0 | Status: SHIPPED | OUTPATIENT
Start: 2022-10-31

## 2022-10-31 NOTE — Clinical Note
[address]  [facility] EMERGENCY DEPT  [address] [facility]  [address] [place] [id number] [phone]    Work/School Note    Date: 10/31/2022    To Whom It May concern:    Naif Ewing was seen and treated today in the emergency room by the following provider(s):  Attending Provider: Trip Murrieta DO. Naif Ewing is excused from work/school on 10/31/2022 through 11/2/2022. She is medically clear to return to work/school on 11/3/2022.          Sincerely,          Valentina Parra DO

## 2022-11-01 NOTE — ED PROVIDER NOTES
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 Pain has progressed and she is now having trouble lifting her arm. Patient stated that she tried applying heat and took a Flexeril yesterday with no relief. Patient stated that she has a history of a pinched nerve in that right side but denies any history of rotator cuff issues. Denies any known injury. Denies any analgesics today. History Provided By: Patient    HPI: Jeff Cantor, 34 y.o. female presents to the ED with cc of right shoulder pain. 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 She states pain is been progressively getting worse. She states that she took Flexeril with no relief yesterday. She states her pain is rated a 7 out of 10 worse with movement. There have been no alleviating factors. She denies any fever or chills. Denies any cough or cold symptoms. She denies any neck or back pain. She denies any chest pain or shortness of breath. There are no other complaints, changes, or physical findings at this time. PCP: [facility], Pcp    No current facility-administered medications on file prior to encounter. Current Outpatient Medications on File Prior to Encounter   Medication Sig Dispense Refill    montelukast (Singulair) 10 mg tablet Take 1 Tablet by mouth daily. 20 Tablet 0    guaiFENesin ER (Mucinex) 600 mg ER tablet Take 1 Tablet by mouth two (2) times a day. 20 Tablet 0    albuterol (PROVENTIL HFA, VENTOLIN HFA, PROAIR HFA) 90 mcg/actuation inhaler Take 2 Puffs by inhalation every four (4) hours as needed for Wheezing.  1 Inhaler 0       Past History     Past Medical History:  None    Past Surgical History:  None    Family History:  none    Social History:  Social History     Tobacco Use    Smoking status: Never    Smokeless tobacco: Never   Substance Use Topics    Alcohol use: No    Drug use: No       Allergies:  No Known Allergies      Review of Systems   Review of Systems   Constitutional: Negative. Negative for appetite change, chills, fatigue and fever. HENT: Negative. Negative for congestion, rhinorrhea, sinus pressure and sore throat. Eyes: Negative. Respiratory: Negative. Negative for cough, choking, chest tightness, shortness of breath and wheezing. Cardiovascular: Negative. Negative for chest pain, palpitations and leg swelling. Gastrointestinal:  Negative for abdominal pain, constipation, diarrhea, nausea and vomiting. Endocrine: Negative. Genitourinary: Negative. Negative for difficulty urinating, dysuria, flank pain and urgency. Musculoskeletal: Negative. Right shoulder pain    Skin: Negative. Neurological: Negative. Negative for dizziness, speech difficulty, weakness, light-headedness, numbness and headaches. Psychiatric/Behavioral: Negative. All other systems reviewed and are negative. Physical Exam   Physical Exam  Vitals and nursing note reviewed. Constitutional:       General: She is not in acute distress. Appearance: She is well-developed. She is obese. She is not diaphoretic. HENT:      Head: Normocephalic and atraumatic. Mouth/Throat:      Pharynx: No oropharyngeal exudate. Eyes:      Extraocular Movements: Extraocular movements intact. Conjunctiva/sclera: Conjunctivae normal.      Pupils: Pupils are equal, round, and reactive to light. Neck:      Vascular: No JVD. Trachea: No tracheal deviation. Cardiovascular:      Rate and Rhythm: Normal rate and regular rhythm. Heart sounds: Normal heart sounds. No murmur heard.   Pulmonary:      Effort: Pulmonary effort is normal. No respiratory distress. Breath sounds: Normal breath sounds. No stridor. No wheezing or rales. Abdominal:      Palpations: Abdomen is soft. Musculoskeletal:      Cervical back: Normal range of motion and neck supple. Comments: Decreased range of motion of the right shoulder secondary to pain. Positive Neer's, negative drop arm, distal PMS intact elbow range of motion intact no C/T/L-spine tenderness palpation. Skin:     General: Skin is warm and dry. Capillary Refill: Capillary refill takes less than 2 seconds. Neurological:      Mental Status: She is alert and oriented to person, place, and time. Cranial Nerves: No cranial nerve deficit. Comments: No gross motor or sensory deficits    Psychiatric:         Mood and Affect: Mood normal.         Behavior: Behavior normal.       Diagnostic Study Results     Labs -  None    Radiologic Studies -   XR SHOULDER RT AP/LAT MIN 2 V   Final Result   No acute abnormality. CT Results  (Last 48 hours)      None          CXR Results  (Last 48 hours)      None            Medical Decision Making   I am the first provider for this patient. I reviewed the vital signs, available nursing notes, past medical history, past surgical history, family history and social history. Vital Signs-Reviewed the patient's vital signs. Patient Vitals for the past 12 hrs:   Temp Pulse Resp BP SpO2   10/31/22 2224 98.2 °F (36.8 °C) 66 18 118/75 100 %       Records Reviewed: Nursing Notes    Provider Notes (Medical Decision Making):   DDx-AC separation, bursitis, rotator cuff injury    ED Course:   Initial assessment performed. The patients presenting problems have been discussed, and they are in agreement with the care plan formulated and outlined with them. I have encouraged them to ask questions as they arise throughout their visit. Discussed with patient likely diagnosis of bursitis versus a shoulder strain.   We will place patient on steroids and muscle relaxer. 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 Disposition:    DC- Adult Discharges: All of the diagnostic tests were reviewed and questions answered. Diagnosis, care plan and treatment options were discussed. The patient understands the instructions and will follow up as directed. The patients results have been reviewed with them. They have been counseled regarding their diagnosis. The patient verbally convey understanding and agreement of the signs, symptoms, diagnosis, treatment and prognosis and additionally agrees to follow up as recommended with their PCP in 24 - 48 hours. They also agree with the care-plan and convey that all of their questions have been answered. I have also put together some discharge instructions for them that include: 1) educational information regarding their diagnosis, 2) how to care for their diagnosis at home, as well a 3) list of reasons why they would want to return to the ED prior to their follow-up appointment, should their condition change. DISCHARGE PLAN:  1. Discharge Medication List as of 10/31/2022 11:51 PM        START taking these medications    Details   diazePAM (Valium) 5 mg tablet Take 1 Tablet by mouth three (3) times daily as needed for Anxiety (spasm). Max Daily Amount: 15 mg., Normal, Disp-15 Tablet, R-0           CONTINUE these medications which have CHANGED    Details   predniSONE (STERAPRED DS) 10 mg dose pack Take as directed, Normal, Disp-48 Tablet, R-0           CONTINUE these medications which have NOT CHANGED    Details   montelukast (Singulair) 10 mg tablet Take 1 Tablet by mouth daily. , Normal, Disp-20 Tablet, R-0      guaiFENesin ER (Mucinex) 600 mg ER tablet Take 1 Tablet by mouth two (2) times a day., Normal, Disp-20 Tablet, R-0      albuterol (PROVENTIL HFA, VENTOLIN HFA, PROAIR HFA) 90 mcg/actuation inhaler Take 2 Puffs by inhalation every four (4) hours as needed for Wheezing., Print, Disp-1 Inhaler, R-0           2. Follow-up Information       Follow up With Specialties Details Why Contact Info    [facility], [address] [facility] [address], Nia Nolasco DO Orthopedic Surgery   [address]  [address]  [address] [id number]            3. Return to ED if worse     Diagnosis     Clinical Impression:   1. Pain in joint of right shoulder    2. Acute shoulder bursitis, right        Attestations:    Cami Cook DO        Please note that this dictation was completed with [employer], the computer voice recognition software. Quite often unanticipated grammatical, syntax, homophones, and other interpretive errors are inadvertently transcribed by the computer software. Please disregard these errors. Please excuse any errors that have escaped final proofreading. Thank you.

## 2022-11-17 ENCOUNTER — HOSPITAL ENCOUNTER (EMERGENCY)
Age: 32
Discharge: HOME | End: 2022-11-17
Attending: EMERGENCY MEDICINE

## 2022-11-17 VITALS
TEMPERATURE: 98.2 F | SYSTOLIC BLOOD PRESSURE: 132 MMHG | RESPIRATION RATE: 18 BRPM | HEIGHT: 65 IN | BODY MASS INDEX: 41.32 KG/M2 | HEART RATE: 69 BPM | DIASTOLIC BLOOD PRESSURE: 80 MMHG | WEIGHT: 248.02 LBS | OXYGEN SATURATION: 98 %

## 2022-11-17 DIAGNOSIS — J02.9 ACUTE PHARYNGITIS, UNSPECIFIED ETIOLOGY: Primary | ICD-10-CM

## 2022-11-17 LAB
COVID-19 RAPID TEST, COVR: NOT DETECTED
FLUAV AG NPH QL IA: NEGATIVE
FLUBV AG NOSE QL IA: NEGATIVE
SOURCE, COVRS: NORMAL

## 2022-11-17 PROCEDURE — 87635 SARS-COV-2 COVID-19 AMP PRB: CPT

## 2022-11-17 PROCEDURE — 74011250637 HC RX REV CODE- 250/637: Performed by: EMERGENCY MEDICINE

## 2022-11-17 PROCEDURE — 99283 EMERGENCY DEPT VISIT LOW MDM: CPT

## 2022-11-17 PROCEDURE — 87804 INFLUENZA ASSAY W/OPTIC: CPT

## 2022-11-17 RX ORDER — BENZOCAINE AND MENTHOL, UNSPECIFIED FORM 15; 2.3 MG/1; MG/1
1 LOZENGE ORAL
Qty: 1 EACH | Refills: 0 | Status: SHIPPED | OUTPATIENT
Start: 2022-11-17 | End: 2022-11-22

## 2022-11-17 RX ORDER — DEXAMETHASONE SODIUM PHOSPHATE 10 MG/ML
10 INJECTION INTRAMUSCULAR; INTRAVENOUS ONCE
Status: COMPLETED | OUTPATIENT
Start: 2022-11-17 | End: 2022-11-17

## 2022-11-17 RX ADMIN — DEXAMETHASONE SODIUM PHOSPHATE 10 MG: 10 INJECTION, SOLUTION INTRAMUSCULAR; INTRAVENOUS at 02:43

## 2022-11-17 NOTE — ED PROVIDER NOTES
EMERGENCY DEPARTMENT HISTORY AND PHYSICAL EXAM       Please note that this dictation was completed with [employer], the computer voice recognition software. Quite often unanticipated grammatical, syntax, homophones, and other interpretive errors are inadvertently transcribed by the computer software. Please disregard these errors. Please excuse any errors that have escaped final proofreading. Date: 11/17/2022  Patient Name: Kannan Snow  Patient Age and Sex: 34 y.o. female    History of Presenting Illness     Chief Complaint   Patient presents with    Sore Throat     Swollen tonsils and upper airway congestion since Sunday. Was seen at [facility] started on amoxicillin and lidocaine drops for pain with no relief. Covid vaccinated no booster and not flu vaccines. B/L tonsil swelling. Back of throat pink and irritated. History Provided By: Patient     Chief Complaint: sore throat      HPI: Kannan Snow, is a 34 y.o. female who presents to the ED with a sore throat since Saturday. [facility] to [facility] on Sunday, dx with pharyngitis, started on amoxicillin and has 2 days of the antibiotic left. She was tested for strep there and the test came back negative. No cough, fever, chills, change in appetite. Taking tylenol and lidocaine rinses for pain. She came in this evening because her symptoms are not improving despite the above treatment. No known sick contacts. No steroid use or immunosuppressive state. No discharge or unusual odor from mouth. Tolerating secretions. Otherwise well with no changes to vision, no dysphonia, no dysphagia. Pt denies any other alleviating or exacerbating factors. No other associated signs or symptoms. There are no other complaints, changes or physical findings at this time.      PCP: [facility], Pcp    Past History   All documented elements of the [facility] reviewed and verified by me. -Allegra Aden MD    Past Medical History:  No past medical history on file.    Past Surgical History:  No past surgical history on file. Family History:   No family history on file. Social History:  Social History     Tobacco Use    Smoking status: Never    Smokeless tobacco: Never   Substance Use Topics    Alcohol use: No    Drug use: No       Current Medications:  No current facility-administered medications on file prior to encounter. Current Outpatient Medications on File Prior to Encounter   Medication Sig Dispense Refill    predniSONE (STERAPRED DS) 10 mg dose pack Take as directed 48 Tablet 0    diazePAM (Valium) 5 mg tablet Take 1 Tablet by mouth three (3) times daily as needed for Anxiety (spasm). Max Daily Amount: 15 mg. 15 Tablet 0    montelukast (Singulair) 10 mg tablet Take 1 Tablet by mouth daily. 20 Tablet 0    guaiFENesin ER (Mucinex) 600 mg ER tablet Take 1 Tablet by mouth two (2) times a day. 20 Tablet 0    albuterol (PROVENTIL HFA, VENTOLIN HFA, PROAIR HFA) 90 mcg/actuation inhaler Take 2 Puffs by inhalation every four (4) hours as needed for Wheezing. 1 Inhaler 0       Allergies:  No Known Allergies    Review of Systems   All other systems reviewed and negative    Review of Systems   Constitutional:  Negative for fever. HENT:  Positive for sore throat. Negative for congestion, dental problem, drooling, ear pain, postnasal drip, rhinorrhea, trouble swallowing and voice change. Eyes: Negative. Negative for photophobia and visual disturbance. Respiratory:  Negative for cough and shortness of breath. Cardiovascular:  Negative for chest pain and palpitations. Gastrointestinal:  Negative for abdominal pain, diarrhea, nausea and vomiting. Endocrine: Negative. Genitourinary:  Negative for dysuria. Musculoskeletal:  Negative for myalgias, neck pain and neck stiffness. Skin:  Negative for rash. Neurological:  Negative for weakness, numbness and headaches. Hematological:  Negative for adenopathy. Psychiatric/Behavioral: Negative. Negative for confusion. All other systems reviewed and are negative. Physical Exam   Reviewed patients vital signs and nursing note    Physical Exam  Vitals and nursing note reviewed. Constitutional:       Appearance: She is not diaphoretic. HENT:      Head: Atraumatic. Right Ear: Tympanic membrane normal.      Left Ear: Tympanic membrane normal.      Nose: Nose normal. No congestion. Mouth/Throat:      Mouth: Mucous membranes are moist. No oral lesions. Pharynx: Uvula midline. Posterior oropharyngeal erythema present. No pharyngeal swelling, oropharyngeal exudate or uvula swelling. Tonsils: No tonsillar exudate or tonsillar abscesses. Eyes:      Extraocular Movements: Extraocular movements intact. Conjunctiva/sclera: Conjunctivae normal.      Pupils: Pupils are equal, round, and reactive to light. Cardiovascular:      Rate and Rhythm: Normal rate and regular rhythm. Pulses: Normal pulses. Heart sounds: Normal heart sounds. Pulmonary:      Effort: Pulmonary effort is normal.      Breath sounds: Normal breath sounds. Abdominal:      Palpations: Abdomen is soft. Tenderness: There is no abdominal tenderness. Musculoskeletal:         General: No tenderness. Normal range of motion. Cervical back: Normal range of motion and neck supple. No rigidity. Lymphadenopathy:      Cervical: No cervical adenopathy. Skin:     General: Skin is warm and dry. Capillary Refill: Capillary refill takes less than 2 seconds. Neurological:      General: No focal deficit present. Mental Status: She is alert. Psychiatric:         Mood and Affect: Mood normal.         Behavior: Behavior normal.       Diagnostic Study Results     Labs - I have personally reviewed and interpreted all laboratory results. Interpretation of available and pertinent results detailed below in MDM.  Jerl Schlatter, MD, MSc  Recent Results (from the past 24 hour(s))   INFLUENZA A+B VIRAL AGS Collection Time: 11/17/22 12:23 AM   Result Value Ref Range    Influenza A Antigen Negative NEG      Influenza B Antigen Negative NEG     COVID-19 RAPID TEST    Collection Time: 11/17/22 12:23 AM   Result Value Ref Range    Specimen source Nasopharyngeal      COVID-19 rapid test Not detected NOTD         Radiologic Studies - I have personally reviewed and interpreted (see MDM for brief interpreation of available results) all imaging studies and agree with radiology interpretation and report. - Noemi Ramos MD, MSc  No orders to display         Medical Decision Making   I am the first provider for this patient. Records Reviewed:   I reviewed our electronic medical record system for any past medical records that were available that may contribute to the patient's current condition, including their PMH, surgical history, social and family history. This includes most recent ED visits, any available discharge summaries and prior ECGs, which I have reviewed and interpreted personally. I have summarized most salient findings in my HPI and MDM. I also reviewed the nursing notes and vital signs from today's visit. Nursing notes will be reviewed as they become available in realtime while the pt has been in the ED. Noemi Ramos MD, MSc    Vital Signs-Reviewed the patient's vital signs. Patient Vitals for the past 24 hrs:   Temp Pulse Resp BP SpO2   11/17/22 0022 98.2 °F (36.8 °C) 69 18 132/80 98 %       Provider Notes and Medical Decision Making:     Patient presents with sore throat. No history of immunocompromise. Nontoxic appearance. Normal vital signs. Patient euvolemic with no trismus. No airway compromise. Able to tolerate PO. Exam shows no tonsillar exudates, uvula is midline, neck not tender on palpation. No palpable lymph nodes appreciated. Given History and Exam I have low suspicion for this presentation being caused by PTA, RPA, Ludwigs, Epiglottitis or Bacterial Tracheitis, Strep throat.  Most likely viral etiology. She was tested here for covid and flu and is negative. Rx: Conservative care. Will give dose of dexamethasone po here. Disposition: Discharge home with prompt outpatient PCP follow up; return precautions discussed. ED Course: The patients presenting problems have been discussed, and they are in agreement with the care plan formulated and outlined with them. I have encouraged them to ask questions as they arise throughout their visit. ED Medications Administered  Medications   dexamethasone (PF) (DECADRON) 10 mg/mL Oral 10 mg (10 mg Oral Given 11/17/22 0243)       ED Orders Placed:  Orders Placed This Encounter    COVID-19 RAPID TEST    STREP AG SCREEN, GROUP A    CULTURE, THROAT    INFLUENZA A+B VIRAL AGS    dexamethasone (PF) (DECADRON) 10 mg/mL Oral 10 mg    benzocaine-menthoL (Cepacol Sore Throat, danielito-men,) 15-2.3 mg lozg     Progress note:  Patient has been reassessed and reports feeling considerably better, has normal vital signs and feels comfortable going home. I think this is reasonable as no findings today suggest a life-threatening condition. DISPOSITION: DISCHARGE  The patient's results have been reviewed with patient and available family and/or caregiver. They verbally convey their understanding and agreement of the patient's signs, symptoms, diagnosis, treatment and prognosis and additionally agree to follow up as recommended in the discharge instructions or to return to the Emergency Department should the patient's condition change prior to their follow-up appointment. The patient and available family and/or caregiver verbally agree with the care plan and all of their questions have been answered.  The discharge instructions have also been provided to the them with educational information regarding the patient's diagnosis as well a list of reasons why the patient would want to return to the ER prior to their follow-up appointment should any concerns arise, the patient's condition change or symptoms worsen. Aron Gaucher, MD, Msc    PLAN:  Current Discharge Medication List        START taking these medications    Details   benzocaine-menthoL (Cepacol Sore Throat, danielito-men,) 15-2.3 mg lozg Take 1 Lozenge by mouth three (3) times daily as needed (sore throat) for up to 5 days. Qty: 1 Each, Refills: 0  Start date: 11/17/2022, End date: 11/22/2022           2. Follow-up Information       Follow up With Specialties Details Why Contact Info    [facility], Pcp  Schedule an appointment as soon as possible for a visit in 1 week As needed [address]      \[facility]\"" EMERGENCY DEPT Emergency Medicine Go to  As needed, If symptoms worsen [address] [place] [address]  [phone]          3. Return to ED if worse       I, Michelle Vazquez MD, am the attending of record for this patient encounter. Diagnosis     Final Clinical Impression:   1. Acute pharyngitis, unspecified etiology        Attestation:  I personally performed the services described in this documentation on this date 11/17/2022 for patient Walter Cárdenas.   Aron Gaucher, MD

## 2022-11-17 NOTE — Clinical Note
[address]  [facility] EMERGENCY DEPT  [address] [facility] [address] [phone]    Work/School Note    Date: 11/17/2022    To Whom It May concern:    Monik Mera was seen and treated today in the emergency room by the following provider(s):  Attending Provider: Reyes Model, MD.      Monik Mera is excused from work/school on 11/17/22 and 11/18/22. She is medically clear to return to work/school on 11/19/2022.        Sincerely,          Merlyn Moreno MD

## 2022-11-17 NOTE — DISCHARGE INSTRUCTIONS
It was a pleasure taking care of you in our Emergency Department today. We know that [facility], you are entrusting us with your health, comfort, and safety. Our physicians and nurses honor that trust, and truly appreciate the opportunity to care for you and your loved ones. We also value your feedback. If you receive a survey about your Emergency Department experience today, please fill it out. We care about our patients' feedback, and we listen to what you have to say.   Thank you!       --- Dr. Allegra Aden MD

## 2022-11-17 NOTE — Clinical Note
[address]  [facility] EMERGENCY DEPT  [address] [place] [address]  Donna Horvath [id number] [phone]    Work/School Note    Date: 11/17/2022    To Whom It May concern:    Monalisa Garcia was seen and treated today in the emergency room by the following provider(s):  Attending Provider: Nitza Barney MD.      Monalisa Garcia is excused from work/school on 11/17/22 and 11/18/22. She is medically clear to return to work/school on 11/19/2022.        Sincerely,          Lissy Qureshi MD

## 2023-02-10 ENCOUNTER — HOSPITAL ENCOUNTER (EMERGENCY)
Age: 33
Discharge: HOME | End: 2023-02-10
Attending: EMERGENCY MEDICINE
Payer: COMMERCIAL

## 2023-02-10 ENCOUNTER — APPOINTMENT (OUTPATIENT)
Dept: GENERAL RADIOLOGY | Age: 33
End: 2023-02-10
Attending: EMERGENCY MEDICINE
Payer: COMMERCIAL

## 2023-02-10 VITALS
HEIGHT: 63 IN | DIASTOLIC BLOOD PRESSURE: 75 MMHG | TEMPERATURE: 98.8 F | OXYGEN SATURATION: 100 % | RESPIRATION RATE: 18 BRPM | WEIGHT: 243.39 LBS | HEART RATE: 65 BPM | BODY MASS INDEX: 43.12 KG/M2 | SYSTOLIC BLOOD PRESSURE: 120 MMHG

## 2023-02-10 DIAGNOSIS — J06.9 VIRAL URI WITH COUGH: Primary | ICD-10-CM

## 2023-02-10 LAB
DEPRECATED S PYO AG THROAT QL EIA: NEGATIVE
FLUAV AG NPH QL IA: NEGATIVE
FLUBV AG NOSE QL IA: NEGATIVE
SARS-COV-2 RDRP RESP QL NAA+PROBE: NOT DETECTED
SOURCE, COVRS: NORMAL

## 2023-02-10 PROCEDURE — 99284 EMERGENCY DEPT VISIT MOD MDM: CPT

## 2023-02-10 PROCEDURE — 87880 STREP A ASSAY W/OPTIC: CPT

## 2023-02-10 PROCEDURE — 87635 SARS-COV-2 COVID-19 AMP PRB: CPT

## 2023-02-10 PROCEDURE — 87070 CULTURE OTHR SPECIMN AEROBIC: CPT

## 2023-02-10 PROCEDURE — 87804 INFLUENZA ASSAY W/OPTIC: CPT

## 2023-02-10 PROCEDURE — 71045 X-RAY EXAM CHEST 1 VIEW: CPT

## 2023-02-10 RX ORDER — BENZONATATE 100 MG/1
100 CAPSULE ORAL
Qty: 21 CAPSULE | Refills: 0 | Status: SHIPPED | OUTPATIENT
Start: 2023-02-10 | End: 2023-02-17

## 2023-02-10 RX ORDER — ALBUTEROL SULFATE 90 UG/1
2 AEROSOL, METERED RESPIRATORY (INHALATION)
Qty: 18 G | Refills: 0 | Status: SHIPPED | OUTPATIENT
Start: 2023-02-10

## 2023-02-10 RX ORDER — GUAIFENESIN 600 MG/1
600 TABLET, EXTENDED RELEASE ORAL 2 TIMES DAILY
Qty: 10 TABLET | Refills: 0 | Status: SHIPPED | OUTPATIENT
Start: 2023-02-10 | End: 2023-02-15

## 2023-02-10 NOTE — ED PROVIDER NOTES
[facility] EMERGENCY DEPT  EMERGENCY DEPARTMENT ENCOUNTER       Pt Name: Peace Garcia  MRN: [id number]  [place] 1990  Date of evaluation: 2/10/2023  Provider: Sameer Steward MD   PCP: [facility], Pcp (Inactive)  Note Started: 6:46 AM 2/10/23     CHIEF COMPLAINT       Chief Complaint   Patient presents with    Cough     Pt ambulatory into triage with c/o sore throat, congestion, and cough with production of phlegm. Symptoms have been ongoing for the last few days. Has not taken anything for these symptoms. Reports was having muscular pain a few days ago, and was given Ibuprofen and a muscle relaxer. HISTORY OF PRESENT ILLNESS: 1 or more elements      History From: Patient, History limited by: No limitations     Peace Garcia is a 28 y.o. female without significant medical history who presents with chief complaint of cough. Symptoms have been present over the past 3 to 4 days. Patient endorses positive sick contacts at home with her son also having similar sick symptoms. Patient endorses cough productive with yellow sputum also with complaints of congestion, rhinorrhea, sinus pressure, sore throat. She developed body aches with 1 episode of nausea and vomiting about 3 days ago, [facility] to [facility] and had negative [facility]-19 and flu testing. She has improved with over-the-counter medication such as NyQuil but cough has been persistent. Nursing Notes were all reviewed and agreed with or any disagreements were addressed in the HPI. REVIEW OF SYSTEMS        Positives and Pertinent negatives as per HPI. PAST HISTORY     Past Medical History:  History reviewed. No pertinent past medical history. Past Surgical History:  History reviewed. No pertinent surgical history. Family History:  History reviewed. No pertinent family history. Social History:  Social History     Tobacco Use    Smoking status: Never    Smokeless tobacco: Never   Substance Use Topics    Alcohol use:  No Drug use: No       Allergies:  No Known Allergies    CURRENT MEDICATIONS      Previous Medications    DIAZEPAM (VALIUM) 5 MG TABLET    Take 1 Tablet by mouth three (3) times daily as needed for Anxiety (spasm). Max Daily Amount: 15 mg. MONTELUKAST (SINGULAIR) 10 MG TABLET    Take 1 Tablet by mouth daily. PREDNISONE (STERAPRED DS) 10 MG DOSE PACK    Take as directed       SCREENINGS               No data recorded         PHYSICAL EXAM      ED Triage Vitals [02/10/23 0638]   ED Encounter Vitals Group      /75      Pulse (Heart Rate) 65      Resp Rate 18      Temp 98.8 °F (37.1 °C)      Temp src       O2 Sat (%) 100 %      Weight 243 lb 6.2 oz      Height 5' 3\"        Physical Exam  Vitals and nursing note reviewed. Constitutional:       General: She is not in acute distress. Appearance: Normal appearance. She is not ill-appearing. HENT:      Nose: Congestion present. Mouth/Throat:      Mouth: Mucous membranes are moist.      Pharynx: No oropharyngeal exudate or posterior oropharyngeal erythema. Eyes:      Extraocular Movements: Extraocular movements intact. Pupils: Pupils are equal, round, and reactive to light. Cardiovascular:      Rate and Rhythm: Normal rate and regular rhythm. Heart sounds: No murmur heard. Pulmonary:      Effort: Pulmonary effort is normal. No respiratory distress. Breath sounds: Normal breath sounds. Musculoskeletal:      Cervical back: No tenderness. Lymphadenopathy:      Cervical: No cervical adenopathy. Neurological:      Mental Status: She is alert.         DIAGNOSTIC RESULTS   LABS:     Recent Results (from the past 12 hour(s))   COVID-19 RAPID TEST    Collection Time: 02/10/23  6:40 AM   Result Value Ref Range    Specimen source Nasopharyngeal      COVID-19 rapid test Not detected NOTD     INFLUENZA A+B VIRAL AGS    Collection Time: 02/10/23  6:40 AM   Result Value Ref Range    Influenza A Antigen Negative NEG      Influenza B Antigen Negative NEG     STREP AG SCREEN, GROUP A    Collection Time: 02/10/23  6:40 AM    Specimen: Swab; Throat   Result Value Ref Range    Group A Strep Ag ID Negative NEG          EKG: If performed, independent interpretation documented below in the MDM section     RADIOLOGY:  Non-plain film images such as CT, Ultrasound and MRI are read by the radiologist. Plain radiographic images are visualized and preliminarily interpreted by the ED Provider with the findings documented in the MDM section. Interpretation per the Radiologist below, if available at the time of this note:     XR CHEST PORT    Result Date: 2/10/2023  EXAM:  XR CHEST PORT INDICATION: Cough COMPARISON: 12/31/2021 TECHNIQUE: Portable chest AP view FINDINGS: The cardiac silhouette is within normal limits. The lungs and pleural spaces are clear. The visualized bones and upper abdomen are unremarkable. No acute process. PROCEDURES   Unless otherwise noted below, none  Procedures     CRITICAL CARE TIME   0    EMERGENCY DEPARTMENT COURSE and DIFFERENTIAL DIAGNOSIS/MDM   Vitals:    Vitals:    02/10/23 0638   BP: 120/75   Pulse: 65   Resp: 18   Temp: 98.8 °F (37.1 °C)   SpO2: 100%   Weight: 110.4 kg (243 lb 6.2 oz)   Height: 5' 3\" (1.6 m)        Patient was given the following medications:  Medications - No data to display    Medical Decision Making  Amount and/or Complexity of Data Reviewed  Labs: ordered. Decision-making details documented in ED Course. Radiology: ordered and independent interpretation performed. Decision-making details documented in ED Course. Risk  OTC drugs. Prescription drug management. 27-year-old female presenting to the emergency department with viral URI symptoms including cough, congestion, rhinorrhea, sinus pressure, body aches with nausea and vomiting over the past 2 to 3 days. Appears clinically well and nontoxic. She is afebrile and vital signs are stable.   No increased work of breathing or hypoxia, lungs clear auscultation bilaterally. Differential diagnosis includes viral syndrome secondary to COVID-19 versus influenza versus other viral etiology, pneumonia. I will evaluate with chest x-ray, COVID-19 versus influenza swab. Encourage close PCP follow-up and treatment with albuterol inhaler and Tessalon Perles as well as other over-the-counter medication. ED Course as of 02/10/23 0802   Fri Feb 10, 2023   9084 Chest x-ray per my independent interpretation no acute process such as acute infiltrate or pneumothorax, confirmed by radiologist.   Flavia Milligan   4831 Rapid strep negative, no clinical signs or symptoms of streptococcal pharyngitis, low Centor score, will hold on empiric antibiotics. [AK]      ED Course User Index  [AK] Sandra Flores MD         FINAL IMPRESSION     1. Viral URI with cough          DISPOSITION/PLAN     Discharge Note:  The patient has been re-evaluated and is ready for discharge. Reviewed available results with patient. Counseled patient on diagnosis and care plan. Patient has expressed understanding, and all questions have been answered. Patient agrees with plan and agrees to follow up as recommended, or to return to the ED if their symptoms worsen. Discharge instructions have been provided and explained to the patient, along with reasons to return to the ED. CLINICAL IMPRESSION    1.  Viral URI with cough         DISPOSITION  discharge     PATIENT REFERRED TO:  Follow-up Information       Follow up With Specialties Details Why [address] Internal Medicine Schedule an appointment as soon as possible for a visit  to establish with a PCP Kassy Fadia  [address] [place] [address] [phone]    [facility] EMERGENCY DEPT Emergency Medicine Go to  As needed, If symptoms worsen [address] [place] [address]  [phone]              DISCHARGE MEDICATIONS:  Current Discharge Medication List        START taking these medications Details   albuterol (PROVENTIL HFA, VENTOLIN HFA, PROAIR HFA) 90 mcg/actuation inhaler Take 2 Puffs by inhalation every four (4) hours as needed for Wheezing. Qty: 18 g, Refills: 0  Start date: 2/10/2023      benzonatate (Tessalon Perles) 100 mg capsule Take 1 Capsule by mouth three (3) times daily as needed for Cough for up to 7 days. Qty: 21 Capsule, Refills: 0  Start date: 2/10/2023, End date: 2/17/2023      guaiFENesin ER (MUCINEX) 600 mg ER tablet Take 1 Tablet by mouth two (2) times a day for 5 days. Qty: 10 Tablet, Refills: 0  Start date: 2/10/2023, End date: 2/15/2023               DISCONTINUED MEDICATIONS:  Current Discharge Medication List          I am the Primary Clinician of Record. Domo Mcdonald MD (electronically signed)    (Please note that parts of this dictation were completed with voice recognition software. Quite often unanticipated grammatical, syntax, homophones, and other interpretive errors are inadvertently transcribed by the computer software. Please disregards these errors.  Please excuse any errors that have escaped final proofreading.)

## 2023-02-10 NOTE — Clinical Note
[address]  [facility] EMERGENCY DEPT  [address] [facility] [id number] [phone]    Work/School Note    Date: 2/10/2023    To Whom It May concern:      Isaac Pryor was seen and treated today in the emergency room by the following provider(s):  Attending Provider: Kalpesh Gama MD.      Isaac Pryor is excused from work/school on 02/10/23. She is clear to return to work/school on 02/11/23.         Sincerely,          Torsten Philip MD

## 2023-02-10 NOTE — DISCHARGE INSTRUCTIONS
You were evaluated in the emergency department for cough and congestion. Your examination was reassuring as was your work-up including chest x-ray, influenza, COVID-19, and strep swabs. It will be important for you to follow-up with your primary care physician in 3-7 days. If you develop worsening symptoms such as fevers or shortness of breath, please return to the emergency department immediately.

## 2023-02-12 LAB
BACTERIA SPEC CULT: NORMAL
SERVICE CMNT-IMP: NORMAL

## 2023-08-07 ENCOUNTER — HOSPITAL ENCOUNTER (EMERGENCY)
Facility: HOSPITAL | Age: 33
Discharge: HOME | End: 2023-08-07
Payer: COMMERCIAL

## 2023-08-07 VITALS
SYSTOLIC BLOOD PRESSURE: 107 MMHG | DIASTOLIC BLOOD PRESSURE: 70 MMHG | WEIGHT: 236.55 LBS | TEMPERATURE: 98.6 F | BODY MASS INDEX: 41.9 KG/M2 | HEART RATE: 72 BPM | OXYGEN SATURATION: 100 % | RESPIRATION RATE: 14 BRPM

## 2023-08-07 DIAGNOSIS — B34.9 VIRAL SYNDROME: ICD-10-CM

## 2023-08-07 DIAGNOSIS — R09.81 NASAL SINUS CONGESTION: Primary | ICD-10-CM

## 2023-08-07 DIAGNOSIS — R53.81 MALAISE: ICD-10-CM

## 2023-08-07 DIAGNOSIS — R52 GENERALIZED BODY ACHES: ICD-10-CM

## 2023-08-07 LAB
FLUAV AG NPH QL IA: NEGATIVE
FLUBV AG NOSE QL IA: NEGATIVE
SARS-COV-2 RDRP RESP QL NAA+PROBE: NOT DETECTED
SOURCE: NORMAL

## 2023-08-07 PROCEDURE — 87804 INFLUENZA ASSAY W/OPTIC: CPT

## 2023-08-07 PROCEDURE — 87635 SARS-COV-2 COVID-19 AMP PRB: CPT

## 2023-08-07 PROCEDURE — 99283 EMERGENCY DEPT VISIT LOW MDM: CPT

## 2023-08-07 PROCEDURE — 6370000000 HC RX 637 (ALT 250 FOR IP): Performed by: PHYSICIAN ASSISTANT

## 2023-08-07 RX ORDER — IBUPROFEN 400 MG/1
800 TABLET ORAL
Status: COMPLETED | OUTPATIENT
Start: 2023-08-07 | End: 2023-08-07

## 2023-08-07 RX ORDER — CETIRIZINE HYDROCHLORIDE 10 MG/1
10 TABLET ORAL DAILY
Qty: 30 TABLET | Refills: 0 | Status: SHIPPED | OUTPATIENT
Start: 2023-08-07 | End: 2023-09-06

## 2023-08-07 RX ORDER — ACETAMINOPHEN 325 MG/1
650 TABLET ORAL EVERY 6 HOURS PRN
Qty: 30 TABLET | Refills: 0 | Status: SHIPPED | OUTPATIENT
Start: 2023-08-07

## 2023-08-07 RX ORDER — FLUTICASONE PROPIONATE 50 MCG
1 SPRAY, SUSPENSION (ML) NASAL DAILY
Qty: 1 EACH | Refills: 1 | Status: SHIPPED | OUTPATIENT
Start: 2023-08-07

## 2023-08-07 RX ORDER — IBUPROFEN 600 MG/1
600 TABLET ORAL EVERY 6 HOURS PRN
Qty: 30 TABLET | Refills: 0 | Status: SHIPPED | OUTPATIENT
Start: 2023-08-07

## 2023-08-07 RX ADMIN — IBUPROFEN 800 MG: 400 TABLET, FILM COATED ORAL at 19:52

## 2023-08-07 ASSESSMENT — PAIN SCALES - GENERAL: PAINLEVEL_OUTOF10: 2

## 2023-08-07 NOTE — ED PROVIDER NOTES
daily     ibuprofen 600 MG tablet  Commonly known as: ADVIL;MOTRIN  Take 1 tablet by mouth every 6 hours as needed for Pain or Fever            ASK your doctor about these medications      albuterol sulfate  (90 Base) MCG/ACT inhaler  Commonly known as: PROVENTIL;VENTOLIN;PROAIR     diazePAM 5 MG tablet  Commonly known as: VALIUM     montelukast 10 MG tablet  Commonly known as: SINGULAIR     predniSONE 10 MG (21) Tbpk               Where to Get Your Medications        These medications were sent to [facility]/pharmacy #[id number] [facility] [address] [facility] [phone] [facility] [phone]  [facility]      Phone: [phone]   acetaminophen 325 MG tablet  cetirizine 10 MG tablet  fluticasone 50 MCG/ACT nasal spray  ibuprofen 600 MG tablet           DISCONTINUED MEDICATIONS:  Discharge Medication List as of 8/7/2023  8:30 PM          I have seen and evaluated the patient autonomously. My supervision physician was on site and available for consultation if needed. I am the Primary Clinician of Record. KELSY Cox (electronically signed)    (Please note that parts of this dictation were completed with voice recognition software. Quite often unanticipated grammatical, syntax, homophones, and other interpretive errors are inadvertently transcribed by the computer software. Please disregards these errors.  Please excuse any errors that have escaped final proofreading.)         KELSY Cox  08/09/23 0338

## 2023-08-08 NOTE — DISCHARGE INSTRUCTIONS
Thank You! It was a pleasure taking care of you in our Emergency Department today. We know that when you come to [facility] you are entrusting us with your health, comfort, and safety. Our clinicians honor that trust, and truly appreciate the opportunity to care for you and your loved ones. We also value your feedback. If you receive a survey about your Emergency Department experience today, please fill it out. We care about our patients' feedback, and we listen to what you have to say. Thank you. Aj Delarosa PA-C    ____________________________________________________________________  I have included a copy of your lab results and/or radiologic studies from today's visit so you can have them easily available at your follow-up visit.    Recent Results (from the past 12 hour(s))   COVID-19, Rapid    Collection Time: 08/07/23  7:51 PM    Specimen: Nasopharyngeal   Result Value Ref Range    Source Nasopharyngeal      SARS-CoV-2, Rapid Not detected NOTD     Rapid influenza A/B antigens    Collection Time: 08/07/23  7:51 PM    Specimen: Nasopharyngeal   Result Value Ref Range    Influenza A Ag Negative NEG      Influenza B Ag Negative NEG         No orders to display     [unfilled]

## 2024-12-05 ENCOUNTER — HOSPITAL ENCOUNTER (EMERGENCY)
Facility: HOSPITAL | Age: 34
Discharge: HOME | End: 2024-12-05
Payer: COMMERCIAL

## 2024-12-05 VITALS
OXYGEN SATURATION: 100 % | HEART RATE: 51 BPM | SYSTOLIC BLOOD PRESSURE: 118 MMHG | TEMPERATURE: 97.9 F | DIASTOLIC BLOOD PRESSURE: 72 MMHG | RESPIRATION RATE: 15 BRPM

## 2024-12-05 DIAGNOSIS — S30.810A EXCORIATION OF BUTTOCK, INITIAL ENCOUNTER: Primary | ICD-10-CM

## 2024-12-05 PROCEDURE — 99282 EMERGENCY DEPT VISIT SF MDM: CPT

## 2024-12-05 NOTE — ED PROVIDER NOTES
[facility] EMERGENCY DEPT  EMERGENCY DEPARTMENT ENCOUNTER       Pt Name: Neida Zheng  MRN: [id number]  Birthdate 1990  Date of evaluation: 12/5/2024  Provider: ORTEGA Rodriguez NP   PCP: Alyssa Potter APRN - NP  Note Started: 1:05 PM 12/5/24     CHIEF COMPLAINT       Chief Complaint   Patient presents with    Rectal Bleeding     Patient reports BRB when wiping with intermittent diarrhea, states \"I think I have an anal fissure.\" Pt reports having one years ago after giving birth to her son. Denies CP/SOB/Lightheadedness.         HISTORY OF PRESENT ILLNESS: 1 or more elements      History From: Patient  None     Neida Zheng is a 34 y.o. female with PMHx of an anal fissure who presents to the ED today for concerns regarding bright small amount of bright red blood on toilet paper when wiping x 2 weeks.  Patient states she also has a burning sensation.  She had an anal fissure ~9 years ago after she delivered her son.  Patient concerned she could have another 1.  Denies itching sensation.  Denies fevers chills, headache, sore throat, cough, chest pain, abdominal pain, nausea, vomiting.  Patient does have issues with constipation and diarrhea.     See MDM Documentation for further HPI and PMHx      Nursing Notes were all reviewed and agreed with or any disagreements were addressed in the HPI.     REVIEW OF SYSTEMS      Review of Systems     Positives and Pertinent negatives as per HPI.    PAST HISTORY     Past Medical History:  No past medical history on file.    Past Surgical History:  No past surgical history on file.    Family History:  No family history on file.    Social History:  Social History     Tobacco Use    Smoking status: Never    Smokeless tobacco: Never   Substance Use Topics    Alcohol use: No    Drug use: No       Allergies:  No Known Allergies    CURRENT MEDICATIONS      Discharge Medication List as of 12/5/2024  9:40 AM        CONTINUE these medications which have NOT  General: Normal range of motion.      Cervical back: Normal range of motion.   Skin:     General: Skin is warm and dry.   Neurological:      General: No focal deficit present.      Mental Status: She is alert.   Psychiatric:         Mood and Affect: Mood normal.         Thought Content: Thought content normal.          EMERGENCY DEPARTMENT COURSE and DIFFERENTIAL DIAGNOSIS/MDM   Vitals:    Vitals:    12/05/24 0815 12/05/24 0915 12/05/24 0945   BP: 127/86 119/80 118/72   Pulse: 56 52 51   Resp: 15 15 15   Temp: 97.9 °F (36.6 °C)     TempSrc: Oral     SpO2: 98% 100% 100%       Patient was given the following medications:  Medications - No data to display    Social Determinants affecting Dx or Tx: None    Records Reviewed (source and summary of external records): Nursing Notes and Old Medical Records reviewed records from 8/7/2023 when patient was seen in this hospital's ER for viral syndrome with nasal congestion, body aches, malaise    MDM: CC/HPI Summary, Chronic Conditions, DDx, ED Course, and Reassessment. Disposition Considerations (Tests not done, Shared Decision Making, Pt Expectation of Test or Tx.):     Neida Zheng is a 34 y.o. female with PMHx of an anal fissure who presents to the ED today for concerns regarding bright small amount of bright red blood on toilet paper when wiping x 2 weeks.  Patient states she also has a burning sensation.  She had an anal fissure ~9 years ago after she delivered her son.  Patient concerned she could have another 1.  Denies itching sensation.  Denies fevers chills, headache, sore throat, cough, chest pain, abdominal pain, nausea, vomiting.  Patient does have issues with constipation and diarrhea.     On exam, well-appearing and pleasant 34-year-old -American female lying in bed no acute distress.  ~1cm x 3mm area of excoriation noted superior to anus with no surrounding erythema, warmth, or tenderness. No hemorrhoids or anal fissure noted.  Skin appears dry.

## 2024-12-05 NOTE — ED NOTES
MARYLU Hunter has reviewed discharge instructions with the patient at this time. Patient has been made aware of prescription(s) called into pharmacy on file for , follow up care, and diagnoses care instructions. The Patient verbalized understanding and denies any further questions. VSS and pt AOx4. Patient ambulatory out to waiting room at this time.